# Patient Record
Sex: MALE | Race: WHITE | NOT HISPANIC OR LATINO | Employment: UNEMPLOYED | URBAN - METROPOLITAN AREA
[De-identification: names, ages, dates, MRNs, and addresses within clinical notes are randomized per-mention and may not be internally consistent; named-entity substitution may affect disease eponyms.]

---

## 2021-11-12 ENCOUNTER — HOSPITAL ENCOUNTER (EMERGENCY)
Facility: HOSPITAL | Age: 21
Discharge: HOME | End: 2021-11-12
Attending: EMERGENCY MEDICINE | Admitting: EMERGENCY MEDICINE
Payer: MEDICAID

## 2021-11-12 VITALS
TEMPERATURE: 98.2 F | HEART RATE: 72 BPM | HEIGHT: 69 IN | OXYGEN SATURATION: 97 % | WEIGHT: 175 LBS | SYSTOLIC BLOOD PRESSURE: 140 MMHG | BODY MASS INDEX: 25.92 KG/M2 | DIASTOLIC BLOOD PRESSURE: 97 MMHG | RESPIRATION RATE: 16 BRPM

## 2021-11-12 DIAGNOSIS — F41.9 ANXIETY: ICD-10-CM

## 2021-11-12 DIAGNOSIS — R00.2 PALPITATIONS: ICD-10-CM

## 2021-11-12 LAB
ANION GAP SERPL CALCULATED.3IONS-SCNC: 7 MMOL/L (ref 5–18)
ATRIAL RATE - MUSE: 70 BPM
BASOPHILS # BLD AUTO: 0.1 10E3/UL (ref 0–0.2)
BASOPHILS NFR BLD AUTO: 1 %
BUN SERPL-MCNC: 13 MG/DL (ref 8–22)
CALCIUM SERPL-MCNC: 9.5 MG/DL (ref 8.5–10.5)
CHLORIDE BLD-SCNC: 106 MMOL/L (ref 98–107)
CO2 SERPL-SCNC: 29 MMOL/L (ref 22–31)
CREAT SERPL-MCNC: 0.77 MG/DL (ref 0.7–1.3)
DIASTOLIC BLOOD PRESSURE - MUSE: 97 MMHG
EOSINOPHIL # BLD AUTO: 0.1 10E3/UL (ref 0–0.7)
EOSINOPHIL NFR BLD AUTO: 2 %
ERYTHROCYTE [DISTWIDTH] IN BLOOD BY AUTOMATED COUNT: 11.8 % (ref 10–15)
GFR SERPL CREATININE-BSD FRML MDRD: >90 ML/MIN/1.73M2
GLUCOSE BLD-MCNC: 102 MG/DL (ref 70–125)
HCT VFR BLD AUTO: 44.3 % (ref 40–53)
HGB BLD-MCNC: 15.3 G/DL (ref 13.3–17.7)
IMM GRANULOCYTES # BLD: 0 10E3/UL
IMM GRANULOCYTES NFR BLD: 0 %
INTERPRETATION ECG - MUSE: NORMAL
LYMPHOCYTES # BLD AUTO: 2.3 10E3/UL (ref 0.8–5.3)
LYMPHOCYTES NFR BLD AUTO: 27 %
MAGNESIUM SERPL-MCNC: 1.9 MG/DL (ref 1.8–2.6)
MCH RBC QN AUTO: 31.7 PG (ref 26.5–33)
MCHC RBC AUTO-ENTMCNC: 34.5 G/DL (ref 31.5–36.5)
MCV RBC AUTO: 92 FL (ref 78–100)
MONOCYTES # BLD AUTO: 1.1 10E3/UL (ref 0–1.3)
MONOCYTES NFR BLD AUTO: 13 %
NEUTROPHILS # BLD AUTO: 5 10E3/UL (ref 1.6–8.3)
NEUTROPHILS NFR BLD AUTO: 57 %
NRBC # BLD AUTO: 0 10E3/UL
NRBC BLD AUTO-RTO: 0 /100
P AXIS - MUSE: 72 DEGREES
PLATELET # BLD AUTO: 241 10E3/UL (ref 150–450)
POTASSIUM BLD-SCNC: 4.1 MMOL/L (ref 3.5–5)
PR INTERVAL - MUSE: 134 MS
QRS DURATION - MUSE: 86 MS
QT - MUSE: 376 MS
QTC - MUSE: 406 MS
R AXIS - MUSE: 88 DEGREES
RBC # BLD AUTO: 4.83 10E6/UL (ref 4.4–5.9)
SODIUM SERPL-SCNC: 142 MMOL/L (ref 136–145)
SYSTOLIC BLOOD PRESSURE - MUSE: 140 MMHG
T AXIS - MUSE: 61 DEGREES
TROPONIN I SERPL-MCNC: <0.01 NG/ML (ref 0–0.29)
VENTRICULAR RATE- MUSE: 70 BPM
WBC # BLD AUTO: 8.5 10E3/UL (ref 4–11)

## 2021-11-12 PROCEDURE — 80048 BASIC METABOLIC PNL TOTAL CA: CPT | Performed by: EMERGENCY MEDICINE

## 2021-11-12 PROCEDURE — 36415 COLL VENOUS BLD VENIPUNCTURE: CPT | Performed by: EMERGENCY MEDICINE

## 2021-11-12 PROCEDURE — 83735 ASSAY OF MAGNESIUM: CPT | Performed by: EMERGENCY MEDICINE

## 2021-11-12 PROCEDURE — 93005 ELECTROCARDIOGRAM TRACING: CPT | Performed by: EMERGENCY MEDICINE

## 2021-11-12 PROCEDURE — 84484 ASSAY OF TROPONIN QUANT: CPT | Performed by: EMERGENCY MEDICINE

## 2021-11-12 PROCEDURE — 99284 EMERGENCY DEPT VISIT MOD MDM: CPT

## 2021-11-12 PROCEDURE — 85025 COMPLETE CBC W/AUTO DIFF WBC: CPT | Performed by: EMERGENCY MEDICINE

## 2021-11-12 ASSESSMENT — ENCOUNTER SYMPTOMS
EYE REDNESS: 0
DIFFICULTY URINATING: 0
ABDOMINAL PAIN: 0
ARTHRALGIAS: 0
TROUBLE SWALLOWING: 0
NAUSEA: 1
WHEEZING: 0
LIGHT-HEADEDNESS: 1
FEVER: 0
HEADACHES: 0
COLOR CHANGE: 0
CONFUSION: 0
NECK STIFFNESS: 0
VOICE CHANGE: 0
SHORTNESS OF BREATH: 0
PALPITATIONS: 1
FACIAL SWELLING: 0

## 2021-11-12 ASSESSMENT — MIFFLIN-ST. JEOR: SCORE: 1789.17

## 2021-11-12 NOTE — ED TRIAGE NOTES
"Pt via EMS with concerns that he is having an allergic reaction to a new medication that he started on Thursday. He is at a residential home and court mandated to be there. The medication is guanfacine for anxiety that he started on Monday. Today was the first day he felt the symptoms of his heart racing and a \"fullness\" in his head. Also got nauseated. He was attempting to fall asleep when he started to feel these symptoms, got up and almost fell over. All started about an hour after taking the medication tonight.  "

## 2021-11-12 NOTE — ED NOTES
Pt being discharged, spoke to Linh at the home pt is currently living at to figure out how to get pt back. She spoke to the  and ok with pt taking a cab back to the facility.  Cab company called and ordered cab for him to return home.

## 2021-11-12 NOTE — ED NOTES
Bed: JNED-15  Expected date: 11/12/21  Expected time: 12:57 AM  Means of arrival: Ambulance  Comments:  Paul, 21 M, allergic reaction

## 2021-11-12 NOTE — ED PROVIDER NOTES
EMERGENCY DEPARTMENT ENCOUNTER     NAME: Kevin Wood   AGE: 21 year old male   YOB: 2000   MRN: [id number]   EVALUATION DATE & TIME: 11/12/2021  1:02 AM   PCP: No primary care provider on file.     Chief Complaint   Patient presents with     Allergic Reaction   :    FINAL IMPRESSION       1. Palpitations    2. Anxiety           ED COURSE & MEDICAL DECISION MAKING      Pertinent Labs & Imaging studies reviewed. (See chart for details)   21 year old male  presents to the Emergency Department for evaluation of having some palpitations and feeling anxious.  Patient started a new anxiety medicine guanfacine 4 days ago, so facility and patient were worried about possible allergic reaction. Initial Vitals Reviewed. Initial exam notable for generally well-appearing male with no wheezing, urticaria, stridor, tachycardia, hypoxia, or any signs of allergic reaction.  I suspect that this was probably an anxiety reaction with panic attack, and symptoms resolved prior to arrival.  I did do labs to look for things like anemia, electrolyte abnormalities, ACS and these were all negative.  He was kept on cardiac telemetry and did not have any arrhythmias.  EKG is normal.  At this time, reassurance given and I will discharge him back to his facility.          1:08AM I met with the patient for the initial interview and physical examination. Discussed plan for treatment and workup in the ED. PPE: Provider wore gloves, goggles, N95 mask, and surgical mask.      At the conclusion of the encounter I discussed the results of all of the tests and the disposition. The questions were answered. The patient or family acknowledged understanding and was agreeable with the care plan.       MEDICATIONS GIVEN IN THE EMERGENCY:   Medications - No data to display   NEW PRESCRIPTIONS STARTED AT TODAY'S ER VISIT   New Prescriptions    No medications on file     ================================================================   HISTORY  "OF PRESENT ILLNESS       Patient information was obtained from: patient and EMS report (per triage note)   Use of Intrepreter: N/A    Kevin Wood is a 21 year old male with no pertinent history who presents to this ED via EMS from residential home for evaluation of possible allergic reaction.     Patient recently completed a treatment program in [place] and was discharged to a residential home, with a court mandate to be there. He discontinued all of his anxiety medications ~4 weeks ago and providers recently started him on guanfacine, which he has been taking for the past 4 days. Reports racing heart, nausea, and \"fullness\" in his head which began ~1 hour after taking the medication tonight, all have since resolved. However, states that the same symptoms happen frequently with his panic attacks. Denies wheezing, voice change, difficulty swallowing, shortness of breath, hives, or any other complaints at this time.     ================================================================    REVIEW OF SYSTEMS       Review of Systems   Constitutional: Negative for fever.   HENT: Negative for congestion, facial swelling, trouble swallowing and voice change.    Eyes: Negative for redness.   Respiratory: Negative for shortness of breath and wheezing.    Cardiovascular: Positive for palpitations (since resolved). Negative for chest pain.   Gastrointestinal: Positive for nausea (since resolved). Negative for abdominal pain.   Genitourinary: Negative for difficulty urinating.   Musculoskeletal: Negative for arthralgias and neck stiffness.   Skin: Negative for color change and rash (hives).   Neurological: Positive for light-headedness (\"head fullness\"; since resolved). Negative for headaches.   Psychiatric/Behavioral: Negative for confusion.   All other systems reviewed and are negative.        PAST HISTORY     PAST MEDICAL HISTORY:   History reviewed. No pertinent past medical history.   anxiety  PAST SURGICAL HISTORY: " "  History reviewed. No pertinent surgical history.   CURRENT MEDICATIONS:   No current outpatient medications on file.    ALLERGIES:   No Known Allergies   FAMILY HISTORY:   History reviewed. No pertinent family history.   SOCIAL HISTORY:   Social History     Socioeconomic History     Marital status: Not on file     Spouse name: Not on file     Number of children: Not on file     Years of education: Not on file     Highest education level: Not on file   Occupational History     Not on file   Tobacco Use     Smoking status: Not on file     Smokeless tobacco: Not on file   Substance and Sexual Activity     Alcohol use: Not on file     Drug use: Not on file     Sexual activity: Not on file   Other Topics Concern     Not on file   Social History Narrative     Not on file     Social Determinants of Health     Financial Resource Strain: Not on file   Food Insecurity: Not on file   Transportation Needs: Not on file   Physical Activity: Not on file   Stress: Not on file   Social Connections: Not on file   Intimate Partner Violence: Not on file   Housing Stability: Not on file        VITALS  Patient Vitals for the past 24 hrs:   BP Temp Temp src Pulse Resp SpO2 Height Weight   11/12/21 0108 (!) 140/97 98.2  F (36.8  C) Oral 75 16 99 % 1.753 m (5' 9\") 79.4 kg (175 lb)        ================================================================    PHYSICAL EXAM     VITAL SIGNS: BP (!) 140/97   Pulse 75   Temp 98.2  F (36.8  C) (Oral)   Resp 16   Ht 1.753 m (5' 9\")   Wt 79.4 kg (175 lb)   SpO2 99%   BMI 25.84 kg/m     Constitutional:  Awake, no acute distress   HENT:  Atraumatic, oropharynx without exudate or erythema, membranes moist  Lymph:  No adenopathy  Eyes: EOM intact, PERRL, no injection  Neck: Supple  Respiratory:  Clear to auscultation bilaterally, no wheezes or crackles   Cardiovascular:  Regular rate and rhythm, single S1 and S2   GI:  Soft, nontender, nondistended, no rebound or guarding   Musculoskeletal:  Moves " all extremities, no lower extremity edema, no deformities    Skin:  Warm, dry  Neurologic:  Alert and oriented x3, no focal deficits noted       ================================================================  LAB       All pertinent labs reviewed and interpreted.   Labs Ordered and Resulted from Time of ED Arrival to Time of ED Departure   BASIC METABOLIC PANEL - Normal       Result Value    Sodium 142      Potassium 4.1      Chloride 106      Carbon Dioxide (CO2) 29      Anion Gap 7      Urea Nitrogen 13      Creatinine 0.77      Calcium 9.5      Glucose 102      GFR Estimate >90     MAGNESIUM - Normal    Magnesium 1.9     TROPONIN I - Normal    Troponin I <0.01     CBC WITH PLATELETS AND DIFFERENTIAL    WBC Count 8.5      RBC Count 4.83      Hemoglobin 15.3      Hematocrit 44.3      MCV 92      MCH 31.7      MCHC 34.5      RDW 11.8      Platelet Count 241      % Neutrophils 57      % Lymphocytes 27      % Monocytes 13      % Eosinophils 2      % Basophils 1      % Immature Granulocytes 0      NRBCs per 100 WBC 0      Absolute Neutrophils 5.0      Absolute Lymphocytes 2.3      Absolute Monocytes 1.1      Absolute Eosinophils 0.1      Absolute Basophils 0.1      Absolute Immature Granulocytes 0.0      Absolute NRBCs 0.0          ===============================================================  RADIOLOGY       Reviewed all pertinent imaging. Please see official radiology report.   No orders to display         ================================================================  EKG       EKG reviewed interpreted by me shows sinus rhythm with rate of 70, normal axis,  with no acute ST or T wave changes no previous to compare  I have independently reviewed and interpreted the EKG(s) documented above.     ================================================================  PROCEDURES         Carmenza DUGGAN, am serving as a scribe to document services personally performed by Dr. Santos based on my observation and the  provider's statements to me. I, Faiza Santos MD attest that Carmenza Ohara is acting in a scribe capacity, has observed my performance of the services and has documented them in accordance with my direction.   Faiza Santos M.D.   Emergency Medicine   [facility] EMERGENCY DEPARTMENT  [address] [place] [id number]  [phone]  Dept: [phone]      Faiza Santos MD  11/12/21 0216

## 2021-11-12 NOTE — DISCHARGE INSTRUCTIONS
EKG and lab studies are normal.  Symptoms were not suggestive of an allergic reaction in any way.  I recommend he follow-up with his psychiatrist to discuss medications as it sounds like anxiety because the symptoms tonight.